# Patient Record
Sex: FEMALE | Race: BLACK OR AFRICAN AMERICAN | NOT HISPANIC OR LATINO | Employment: UNEMPLOYED | URBAN - METROPOLITAN AREA
[De-identification: names, ages, dates, MRNs, and addresses within clinical notes are randomized per-mention and may not be internally consistent; named-entity substitution may affect disease eponyms.]

---

## 2020-05-20 ENCOUNTER — CLINICAL SUPPORT (OUTPATIENT)
Dept: URGENT CARE | Facility: CLINIC | Age: 30
End: 2020-05-20
Payer: MEDICAID

## 2020-05-20 ENCOUNTER — OFFICE VISIT (OUTPATIENT)
Dept: FAMILY MEDICINE | Facility: CLINIC | Age: 30
End: 2020-05-20
Payer: MEDICAID

## 2020-05-20 VITALS
BODY MASS INDEX: 27.34 KG/M2 | TEMPERATURE: 98 F | WEIGHT: 174.19 LBS | OXYGEN SATURATION: 98 % | HEART RATE: 85 BPM | DIASTOLIC BLOOD PRESSURE: 82 MMHG | SYSTOLIC BLOOD PRESSURE: 120 MMHG | HEIGHT: 67 IN

## 2020-05-20 DIAGNOSIS — Z01.818 PRE-OPERATIVE CLEARANCE: Primary | ICD-10-CM

## 2020-05-20 DIAGNOSIS — R51.9 NONINTRACTABLE HEADACHE, UNSPECIFIED CHRONICITY PATTERN, UNSPECIFIED HEADACHE TYPE: Primary | ICD-10-CM

## 2020-05-20 PROCEDURE — 99999 PR PBB SHADOW E&M-EST. PATIENT-LVL III: ICD-10-PCS | Mod: PBBFAC,,, | Performed by: INTERNAL MEDICINE

## 2020-05-20 PROCEDURE — U0003 INFECTIOUS AGENT DETECTION BY NUCLEIC ACID (DNA OR RNA); SEVERE ACUTE RESPIRATORY SYNDROME CORONAVIRUS 2 (SARS-COV-2) (CORONAVIRUS DISEASE [COVID-19]), AMPLIFIED PROBE TECHNIQUE, MAKING USE OF HIGH THROUGHPUT TECHNOLOGIES AS DESCRIBED BY CMS-2020-01-R: HCPCS

## 2020-05-20 PROCEDURE — 99213 OFFICE O/P EST LOW 20 MIN: CPT | Mod: PBBFAC,PO | Performed by: INTERNAL MEDICINE

## 2020-05-20 PROCEDURE — 99999 PR PBB SHADOW E&M-EST. PATIENT-LVL III: CPT | Mod: PBBFAC,,, | Performed by: INTERNAL MEDICINE

## 2020-05-20 PROCEDURE — 99203 OFFICE O/P NEW LOW 30 MIN: CPT | Mod: S$PBB,,, | Performed by: INTERNAL MEDICINE

## 2020-05-20 PROCEDURE — 99203 PR OFFICE/OUTPT VISIT, NEW, LEVL III, 30-44 MIN: ICD-10-PCS | Mod: S$PBB,,, | Performed by: INTERNAL MEDICINE

## 2020-05-20 RX ORDER — BUTALBITAL, ACETAMINOPHEN AND CAFFEINE 50; 325; 40 MG/1; MG/1; MG/1
1 TABLET ORAL EVERY 4 HOURS PRN
Qty: 30 TABLET | Refills: 0 | Status: SHIPPED | OUTPATIENT
Start: 2020-05-20 | End: 2020-06-19

## 2020-05-20 NOTE — PROGRESS NOTES
Subjective:       Patient ID: Figueroa Wallace is a 29 y.o. female.    Chief Complaint: Headache    Pt here for 3 day history of headache. She says it is frontal and feels like constant pressure. She has been taking tyelnol and ibuprofen without relief.  She has some nausea with it.  She denies blurry vision.  She says the headache is constant. She has a mild runny nose and scratchy throat.  No fevers. She has had mild diarrhea.      Review of Systems   Constitutional: Negative for fatigue, fever and unexpected weight change.   HENT: Positive for congestion. Negative for postnasal drip, sinus pain and sore throat.    Eyes: Negative for visual disturbance.   Respiratory: Negative for cough, chest tightness, shortness of breath and wheezing.    Cardiovascular: Negative for chest pain, palpitations and leg swelling.   Gastrointestinal: Positive for diarrhea. Negative for abdominal pain, blood in stool, constipation, nausea and vomiting.   Endocrine: Negative for cold intolerance and heat intolerance.   Genitourinary: Negative for difficulty urinating, dysuria and frequency.   Musculoskeletal: Negative for back pain, joint swelling and myalgias.   Skin: Negative for rash.   Allergic/Immunologic: Negative for environmental allergies.   Neurological: Positive for headaches. Negative for dizziness, seizures and numbness.   Hematological: Does not bruise/bleed easily.   Psychiatric/Behavioral: Negative for agitation and sleep disturbance.       Objective:      Physical Exam   Constitutional: She is oriented to person, place, and time. She appears well-developed and well-nourished.   HENT:   Head: Normocephalic and atraumatic.   Mouth/Throat: Oropharynx is clear and moist.   Eyes: Pupils are equal, round, and reactive to light. Conjunctivae and EOM are normal.   Neck: Normal range of motion. Neck supple. No thyromegaly present.   Cardiovascular: Normal rate, regular rhythm, normal heart sounds and intact distal pulses. Exam  reveals no gallop and no friction rub.   No murmur heard.  Pulmonary/Chest: Effort normal and breath sounds normal. No respiratory distress. She has no wheezes. She has no rales.   Abdominal: Soft. Bowel sounds are normal. She exhibits no distension. There is no tenderness.   Musculoskeletal: Normal range of motion. She exhibits no edema.   Lymphadenopathy:     She has no cervical adenopathy.   Neurological: She is alert and oriented to person, place, and time. No cranial nerve deficit.   Skin: Skin is warm and dry.   Psychiatric: She has a normal mood and affect. Her behavior is normal.       Assessment:       1. Nonintractable headache, unspecified chronicity pattern, unspecified headache type        Plan:       Headache-- fioricet prn, hydration and rest.  Call if no improvement. Pt having runny nose, mild sore throat, and mild gi symptoms- works at [employer]. Will test for covid 19

## 2020-05-20 NOTE — LETTER
May 20, 2020      [facility]  [address] [place] [address]  Phone: [phone]  Fax: [phone]       Patient: Figueroa Wallace   YOB: 1990  Date of Visit: 05/20/2020    To Whom It May Concern:    Sona Wallace  was at [facility] on 05/20/2020. She may return to work/school on 5/22/2020 with no restrictions. Please excuse patient from work on 5/19/2020-5/21/2020.  If you have any questions or concerns, or if I can be of further assistance, please do not hesitate to contact me.    Sincerely,          Lamar Vaughan MD

## 2020-05-21 ENCOUNTER — TELEPHONE (OUTPATIENT)
Dept: FAMILY MEDICINE | Facility: CLINIC | Age: 30
End: 2020-05-21

## 2020-05-21 ENCOUNTER — TELEPHONE (OUTPATIENT)
Dept: URGENT CARE | Facility: CLINIC | Age: 30
End: 2020-05-21

## 2020-05-21 LAB — SARS-COV-2 RNA RESP QL NAA+PROBE: NOT DETECTED

## 2020-05-21 NOTE — TELEPHONE ENCOUNTER
----- Message from Fay Rayo sent at 5/21/2020  3:01 PM CDT -----  Contact: PT  Type: Needs Medical Advice    Who Called:  Pt  Symptoms (please be specific):  cough  How long has patient had these symptoms:  today  Pharmacy name and phone #:    [id number] [address] [address] [place] [facility] [place] [address]  [address] [place] [address]  Phone: [phone] Fax: [phone]  Best Call Back Number: [phone]  Additional Information: Please Advise ---Thank you      Is that what you want?    
Attempted to return call placed to clinic no answer lvm  
19-Oct-2018 03:35

## 2020-05-21 NOTE — TELEPHONE ENCOUNTER
Called patient to discuss COVID-19 results - NOT DETECTED. Patient aware and verbalized understanding.

## 2020-08-10 ENCOUNTER — OFFICE VISIT (OUTPATIENT)
Dept: URGENT CARE | Facility: CLINIC | Age: 30
End: 2020-08-10
Payer: MEDICAID

## 2020-08-10 VITALS
SYSTOLIC BLOOD PRESSURE: 149 MMHG | BODY MASS INDEX: 27.31 KG/M2 | HEIGHT: 67 IN | TEMPERATURE: 99 F | DIASTOLIC BLOOD PRESSURE: 92 MMHG | RESPIRATION RATE: 16 BRPM | WEIGHT: 174 LBS | HEART RATE: 100 BPM | OXYGEN SATURATION: 100 %

## 2020-08-10 DIAGNOSIS — M79.645 THUMB PAIN, LEFT: Primary | ICD-10-CM

## 2020-08-10 PROCEDURE — 73130 XR HAND COMPLETE 3 VIEW LEFT: ICD-10-PCS | Mod: LT,S$GLB,, | Performed by: RADIOLOGY

## 2020-08-10 PROCEDURE — 73130 X-RAY EXAM OF HAND: CPT | Mod: LT,S$GLB,, | Performed by: RADIOLOGY

## 2020-08-10 PROCEDURE — 99214 OFFICE O/P EST MOD 30 MIN: CPT | Mod: S$GLB,,, | Performed by: PHYSICIAN ASSISTANT

## 2020-08-10 PROCEDURE — 99214 PR OFFICE/OUTPT VISIT, EST, LEVL IV, 30-39 MIN: ICD-10-PCS | Mod: S$GLB,,, | Performed by: PHYSICIAN ASSISTANT

## 2020-08-10 RX ORDER — NAPROXEN 500 MG/1
500 TABLET ORAL 2 TIMES DAILY WITH MEALS
Qty: 20 TABLET | Refills: 0 | Status: SHIPPED | OUTPATIENT
Start: 2020-08-10 | End: 2020-08-20

## 2020-08-10 NOTE — PROGRESS NOTES
"Subjective:       Patient ID: Figueroa Wallace is a 29 y.o. female.    Vitals:  height is 5' 7" (1.702 m) and weight is 78.9 kg (174 lb). Her oral temperature is 98.6 °F (37 °C). Her blood pressure is 149/92 (abnormal) and her pulse is 100. Her respiration is 16 and oxygen saturation is 100%.     Chief Complaint: Finger Injury and Hand Pain    Pt present with left thumb pain since yesterday. She believes she may have "knocked" it on something. Pain 9/10    Hand Pain   The incident occurred 12 to 24 hours ago. There was no injury mechanism. The pain is present in the left fingers. The quality of the pain is described as aching. The pain is at a severity of 9/10. The pain has been constant since the incident. Pertinent negatives include no chest pain, muscle weakness, numbness or tingling. Nothing aggravates the symptoms. She has tried nothing for the symptoms. The treatment provided no relief.       Constitution: Negative for chills, fatigue and fever.   HENT: Negative for congestion and sore throat.    Neck: Negative for painful lymph nodes.   Cardiovascular: Negative for chest pain and leg swelling.   Eyes: Negative for double vision and blurred vision.   Respiratory: Negative for cough and shortness of breath.    Gastrointestinal: Negative for nausea, vomiting and diarrhea.   Genitourinary: Negative for dysuria, frequency, urgency and history of kidney stones.   Musculoskeletal: Positive for joint pain. Negative for muscle cramps and muscle ache.   Skin: Negative for color change, pale, rash and bruising.   Allergic/Immunologic: Negative for seasonal allergies.   Neurological: Negative for dizziness, history of vertigo, light-headedness, passing out, headaches and numbness.   Hematologic/Lymphatic: Negative for swollen lymph nodes.   Psychiatric/Behavioral: Negative for nervous/anxious, sleep disturbance and depression. The patient is not nervous/anxious.        Objective:      Physical Exam   Constitutional: She is " oriented to person, place, and time. She appears well-developed. She is cooperative.  Non-toxic appearance. She does not appear ill. No distress.   HENT:   Head: Normocephalic and atraumatic.   Ears:   Right Ear: Hearing normal.   Left Ear: Hearing normal.   Eyes: Conjunctivae and lids are normal.   Neck: Trachea normal, normal range of motion and phonation normal. Neck supple.   Cardiovascular: Normal rate, regular rhythm, normal heart sounds and normal pulses.   Pulmonary/Chest: Effort normal and breath sounds normal.   Abdominal: Normal appearance.   Musculoskeletal:         General: No deformity.      Left hand: She exhibits tenderness. She exhibits normal range of motion, normal two-point discrimination, normal capillary refill, no deformity, no laceration and no swelling. Normal sensation noted.        Hands:    Neurological: She is alert and oriented to person, place, and time. She has normal strength and normal reflexes. No sensory deficit.   Skin: Skin is warm, dry, intact and not diaphoretic. Psychiatric: Her speech is normal and behavior is normal. Judgment and thought content normal.   Nursing note and vitals reviewed.        Xr Hand Complete 3 View Left    Result Date: 8/10/2020  EXAMINATION: XR HAND COMPLETE 3 VIEW LEFT CLINICAL HISTORY: Pain in left finger(s). TECHNIQUE: PA, lateral, and oblique views of the left hand were performed. COMPARISON: None. FINDINGS: No evidence of acute fracture or dislocation.  Soft tissues are symmetric.  No radiopaque foreign body.     No acute bony abnormality. Electronically signed by: Chetan Gomez MD Date:    08/10/2020 Time:    10:44      Assessment:       1. Thumb pain, left        Plan:       All hx was provided by the pt or available as part of established EMR. The pt past medical hx, family hx, social hx, and current medications were reviewed. Interpretation of diagnostics performed today were discussed. Pt to follow up with [facility] if no improvement or for any  concern, and seek treatment in an ER for worsening. Tx options discussed. Pt voiced understanding of all discussed, and agreed with decision making.    Thumb pain, left  -     XR HAND COMPLETE 3 VIEW LEFT; Future; Expected date: 08/10/2020  -     naproxen (NAPROSYN) 500 MG tablet; Take 1 tablet (500 mg total) by mouth 2 (two) times daily with meals. for 10 days  Dispense: 20 tablet; Refill: 0

## 2020-08-10 NOTE — PATIENT INSTRUCTIONS
· Follow up with your primary care if symptoms do not improve, or you may return here at any time.  · If you were referred to a specialist, please follow up with that specialty.  · If you were prescribed antibiotics, please take them to completion.  · If you were prescribed a narcotic or any medication with sedative effects, do not drive or operate heavy equipment or machinery while taking these medications.  · You must understand that you have received treatment at an Urgent Care facility only, and that you may be released before all of your medical problems are known or treated. Urgent Care facilities are not equipped to handle life threatening emergencies. It is recommended that you seek care at an Emergency Department for further evaluation of worsening or concerning symptoms, or possibly life threatening conditions as discussed.                                        If you  smoke, please stop smoking               PLEASE PRACTICE SOCIAL DISTANCING            Hand Contusion  You have a contusion. This is also called a bruise. There is swelling and some bleeding under the skin, but no broken bones. This injury generally takes a few days to a few weeks to heal.  During that time, the bruise will typically change in color from reddish, to purple-blue, to greenish-yellow, then to yellow-brown.  Home care  · Elevate the hand to reduce pain and swelling. As much as possible, sit or lie down with the hand raised about the level of your heart. This is especially important during the first 48 hours.  · Ice the hand to help reduce pain and swelling. Wrap a cold source (ice pack or ice cubes in a plastic bag) in a thin towel. Apply to the bruised area for 20 minutes every 1 to 2 hours the first day. Continue this 3 to 4 times a day until the pain and swelling goes away.  · Unless another medicine was prescribed, you can take acetaminophen, ibuprofen, or naproxen to control pain. (If you have chronic liver or kidney  disease or ever had a stomach ulcer or gastrointestinal bleeding, talk with your doctor before using these medicines.)  Follow up  Follow up with your healthcare provider or our staff as advised. Call if you are not improving within 1 to 2 weeks.  When to seek medical advice   Call your healthcare provider right away if you have any of the following:  · Increased pain or swelling  · Arm becomes cold, blue, numb or tingly  · Signs of infection: Warmth, drainage, or increased redness or pain around the bruise  · Inability to move the injured hand   · Frequent bruising for unknown reasons  Date Last Reviewed: 2/1/2017  © 3299-3701 [employer]. [address] [place] [address], [place] [address]. All rights reserved. This information is not intended as a substitute for professional medical care. Always follow your healthcare professional's instructions.

## 2020-08-10 NOTE — LETTER
August 10, 2020      [facility] [address], [address]  [facility] [address]  Phone: [phone]  Fax: [phone]       Patient: Figueroa Wallace   YOB: 1990  Date of Visit: 08/10/2020    To Whom It May Concern:    Sona Wallace  was at [facility] on 08/10/2020. She may return to work/school on 8/11/2020 with no restrictions. If you have any questions or concerns, or if I can be of further assistance, please do not hesitate to contact me.    Sincerely,    Jaron Robb PA-C

## 2021-04-29 ENCOUNTER — PATIENT MESSAGE (OUTPATIENT)
Dept: RESEARCH | Facility: HOSPITAL | Age: 31
End: 2021-04-29

## 2021-12-03 ENCOUNTER — TELEPHONE (OUTPATIENT)
Dept: FAMILY MEDICINE | Facility: CLINIC | Age: 31
End: 2021-12-03
Payer: MEDICAID

## 2022-10-06 DIAGNOSIS — M25.571 PAIN IN JOINT OF RIGHT ANKLE: Primary | ICD-10-CM

## 2022-10-11 ENCOUNTER — CLINICAL SUPPORT (OUTPATIENT)
Dept: REHABILITATION | Facility: HOSPITAL | Age: 32
End: 2022-10-11
Payer: MEDICAID

## 2022-10-11 DIAGNOSIS — M25.671 DECREASED RANGE OF MOTION OF RIGHT ANKLE: ICD-10-CM

## 2022-10-11 DIAGNOSIS — M25.571 CHRONIC PAIN OF RIGHT ANKLE: ICD-10-CM

## 2022-10-11 DIAGNOSIS — G89.29 CHRONIC PAIN OF RIGHT ANKLE: ICD-10-CM

## 2022-10-11 DIAGNOSIS — R26.9 GAIT DIFFICULTY: ICD-10-CM

## 2022-10-11 PROCEDURE — 97110 THERAPEUTIC EXERCISES: CPT | Mod: PO

## 2022-10-11 PROCEDURE — 97161 PT EVAL LOW COMPLEX 20 MIN: CPT | Mod: PO

## 2022-10-11 NOTE — PLAN OF CARE
[facility]   Physical Therapy Initial Evaluation     Date: 10/11/2022   Name: Figueroa Wallace  Clinic Number: [id number]    Therapy Diagnosis:   Encounter Diagnoses   Name Primary?    Chronic pain of right ankle     Gait difficulty     Decreased range of motion of right ankle      Physician: Pat Weiner MD    Physician Orders: PT Eval and Treat   Medical Diagnosis from Referral: Pain in joint of right ankle   Evaluation Date: 10/11/2022  Authorization Period Expiration: 2022  Plan of Care Expiration: 2022  Progress Note Due: 2022  Visit # / Visits authorized:    FOTO: 1/3    Precautions: Standard     Time In: 800  Time Out: 848  Total Appointment Time (timed & untimed codes): 48 minutes      SUBJECTIVE   Date of onset: ~2 years    History of current condition - Figueroa reports: She was walking in heels two years ago twisted her foot in a plantar flexed and supinated pain. She was placed in a cast for 6 weeks and then she was in a walking boot for 8 weeks beginning in  of this year. She never received PT. She reports she has taken pain medication and received a shot in her ankle. She notes her pain is worse at the end of the day after prolonged standing and walking. No numbness or tingling. Denies any falls.    Falls: No    Imaging, X-ray: Not on file    Prior Therapy: Yes  Social History: Lives with family   Occupation: Surgery  at [employer]   Prior Level of Function: No limitations   Current Level of Function: Increased pain with ADLs, unable to wear heels    Pain:  Current 0/10, worst 10/10, best 0/10   Location: right ankles  Description: Aching  Aggravating Factors: Walking  Easing Factors: pain medication, ice, and rest    Patients goals: No pain     Medical History:   Past Medical History:   Diagnosis Date    Anxiety     H/O  section     H/O hernia repair     History of pre-eclampsia     Migraines        Surgical History:   Figueroa  [place]  has a past surgical history that includes Hernia repair ();  section; cholcystecomy (); Gallbladder surgery (); and Cholecystectomy.    Medications:   Figueroa currently has no medications in their medication list.    Allergies:   Review of patient's allergies indicates:  No Known Allergies     Objective     Observation: Patient in no acute distress      Posture: Pes cavus on (L)     Gait: Increased supination (L) foot, decreased ankle DF (B)     ROM:  Ankle Left Right   Dorsiflexion 15 (NT) degrees 7 (NT) degrees   Plantarflexion 35 (NT) degrees 40 (NT) degrees   Inversion 15 (NT) degrees 10* (NT) degrees   Eversion 20 (NT) degrees 10* (NT) degrees     *pain    MMT:    Ankle Left Right   Dorsiflexion 5/5 4+/5   Plantarflexion 5/5 4/5*   Inversion 4+/5 4/5*   Eversion 4+/5 4+/5     Special Tests:    Ankle Left Right   Anterior Drawer Test Negative Positive     Joint Mobility: Hypomobile (R) talocrural with AP    Palpation: TTP (R) ATFL, peroneals     Sensation: Intact to LT (B) LE's    Flexibility: R gastroc limited with passive dorsiflexion    Balance: Maintains SLS 30 seconds with good balance strategies.      CMS Impairment/Limitation/Restriction for FOTO Ankle Survey    Therapist reviewed FOTO scores for Figueroa Wallace on 10/11/2022.   FOTO documents entered into [employer] - see Media section.    Limitation Score: 44%  Category: Mobility         TREATMENT   Treatment Time In: 825  Treatment Time Out: 849  Total Treatment time separate from Evaluation: 24 minutes    Figueroa received therapeutic exercises to develop strength, ROM, and posture for 14 minutes including:  - Ankle 4-way 2 x 10 YTB  - Short foot 2 x 10 3'' hold  - Standing calf stretch runner stretch 2 x 30s    Figueroa received the following manual therapy techniques: Joint mobilizations and Soft tissue Mobilization were applied to the: (R) ankle for 10 minutes, including:  - IASTM to (R) ATFL and CFL cross friction  - Talocrural joint  mobs AP grade I-II    Home Exercises and Patient Education Provided    Education provided:   - Role of PT, PT POC  - HEP    Written Home Exercises Provided: yes.  Exercises were reviewed and Figueroa was able to demonstrate them prior to the end of the session.  Figueroa demonstrated good  understanding of the education provided.     See EMR under Media for exercises provided 10/11/2022.    Assessment   Figueroa is a 31 y.o. female referred to outpatient Physical Therapy with a medical diagnosis of Pain in joint of right ankle. Physical exam is consistent with chronic (R) ankle pain s/p lateral ankle sprain with ROM, strength, and gait deficits. Primary impairments include AROM, PROM, joint mobility, strength, balance, soft tissue restrictions, and pain which limits functional mobility. This pt is a good candidate for skilled PT tx and stands to benefit from a combination of manual therapy including joint mobilizations with trigger point/myofacscial release, therapeutic exercise to establish core/joint stability, neuromuscular re-education, and modalities Prn. The pt has been educated on their dx/POC and consents to further PT tx.    Pt prognosis is Good.   Pt will benefit from skilled outpatient Physical Therapy to address the deficits stated above and in the chart below, provide pt/family education, and to maximize pt's level of independence.     Plan of care discussed with patient: Yes  Pt's spiritual, cultural and educational needs considered and patient is agreeable to the plan of care and goals as stated below:     Anticipated Barriers for therapy: None    Medical Necessity is demonstrated by the following  History  Co-morbidities and personal factors that may impact the plan of care Co-morbidities:   young age    Personal Factors:   no deficits     low   Examination  Body Structures and Functions, activity limitations and participation restrictions that may impact the plan of care Body Regions:   lower  extremities    Body Systems:    ROM  strength  gross coordinated movement  balance  gait    Participation Restrictions:   Limited by pain with prolonged job duties     Activity limitations:   Learning and applying knowledge  no deficits    General Tasks and Commands  no deficits    Communication  no deficits    Mobility  lifting and carrying objects  walking  driving (bike, car, motorcycle)    Self care  dressing    Domestic Life  shopping  cooking  doing house work (cleaning house, washing dishes, laundry)    Interactions/Relationships  no deficits    Life Areas  employment    Community and Social Life  community life  recreation and leisure         moderate   Clinical Presentation stable and uncomplicated low   Decision Making/ Complexity Score: low     Goals:  Short-Term Goals: 3 weeks  1) The patient will be independent and compliant with initial home exercise program as prescribed by physical therapist.  2) The patient will increase activity range of motion in the right ankle by 5 degrees in order to restore normal mobility for gait.  3) The patient will increase strength in MMT of the right ankle to 4+/5 in order to demonstrate improvements in functional strength for weight-bearing and gait.    Long-Term Goals: 6 weeks  1) Pt to achieve <34% limitation as measured by the FOTO to demonstrate decreased disability.  2) The patient will increase activity range of motion in the right ankle to <10% limitation compared to (L) in order to restore normal mobility for gait.  3) The patient will increase strength in MMT of the right  ankle to 5/5 in order to demonstrate improvements in functional strength for weight-bearing and gait.  4) Patient will improve SLS to >30s to improve balance and stability with ambulation without pain and with good arch control.  5) Pt will report (R) ankle pain <2/10 after 8 hour work day to improve tolerance to job duties.      Plan   Plan of care Certification: 10/11/2022 to  12/16/2022.    Outpatient Physical Therapy 2 times weekly for 12 visits to include the following interventions: Electrical Stimulation prn, Gait Training, Manual Therapy, Moist Heat/ Ice, Neuromuscular Re-ed, Patient Education, Therapeutic Activities, and Therapeutic Exercise.     Gasper Ivy, PT      I CERTIFY THE NEED FOR THESE SERVICES FURNISHED UNDER THIS PLAN OF TREATMENT AND WHILE UNDER MY CARE   Physician's comments:     Physician's Signature: ___________________________________________________

## 2022-10-13 ENCOUNTER — CLINICAL SUPPORT (OUTPATIENT)
Dept: REHABILITATION | Facility: HOSPITAL | Age: 32
End: 2022-10-13
Payer: MEDICAID

## 2022-10-13 DIAGNOSIS — M25.671 DECREASED RANGE OF MOTION OF RIGHT ANKLE: ICD-10-CM

## 2022-10-13 DIAGNOSIS — M25.571 CHRONIC PAIN OF RIGHT ANKLE: Primary | ICD-10-CM

## 2022-10-13 DIAGNOSIS — R26.9 GAIT DIFFICULTY: ICD-10-CM

## 2022-10-13 DIAGNOSIS — G89.29 CHRONIC PAIN OF RIGHT ANKLE: Primary | ICD-10-CM

## 2022-10-13 PROCEDURE — 97110 THERAPEUTIC EXERCISES: CPT | Mod: PO,CQ

## 2022-10-13 NOTE — PROGRESS NOTES
[facility]   Physical Therapy Treatment Note     Name: Figueroa Wallace  Clinic Number: [id number]    Therapy Diagnosis:   Encounter Diagnoses   Name Primary?    Chronic pain of right ankle Yes    Gait difficulty     Decreased range of motion of right ankle      Physician: Pat Weiner MD    Visit Date: 10/13/2022  Physician Orders: PT Eval and Treat   Medical Diagnosis from Referral: Pain in joint of right ankle   Evaluation Date: 10/11/2022  Authorization Period Expiration: 12/31/2022  Plan of Care Expiration: 12/16/2022  Progress Note Due: 11/11/2022  Visit # / Visits authorized: 1/ 20  FOTO: 1/3    PTA Visit #: 1/5     Time In: 0908 AM  Time Out: 0956 AM  Total Billable Time: 30 minutes    Precautions: Standard     SUBJECTIVE     Pt reports: her ankle is painful this morning. Patient states she worked yesterday and then went home and performed her HEP which made soreness a little worse.  She was compliant with home exercise program.  Response to previous treatment: soreness   Functional change: too soon to determine     Pain: 8/10  Location: right ankle    OBJECTIVE     Objective Measures updated at progress report unless specified.     Treatment     Figueroa received the treatments listed below:      therapeutic exercises to develop strength, endurance, ROM, flexibility, posture, and core stabilization for 30 minutes including:  - Upright bike x 5   - Long sitting gastroc stretch with strap x 1 minute x 2   - Ankle circles: CW/CCW 2x10  - Ankle ABCs x 1   - PF, DF, IV with yellow TB 2x10  - Isometric Eversion x 10, 5 sec hold     - Seated heel slides x 2 minutes, 5 sec hold   - Seated rockerboard x 2 minutes, 5 sec hold   - Seated windshield wiper x 2 minutes  - Short foot 2 x 10 3'' hold  - Standing calf stretch runner stretch 2 x 30s     manual therapy techniques: Joint mobilizations were applied to the: R ankle for 15 minutes, including:  - IASTM to (R) ATFL and CFL cross  "friction  - Talocrural joint mobs AP grade I-II    Patient Education and Home Exercises     Home Exercises Provided and Patient Education Provided     Education provided:   - HEP compliance     Written Home Exercises Provided: Patient instructed to cont prior HEP. Exercises were reviewed and Figueroa was able to demonstrate them prior to the end of the session.  Figueroa demonstrated good  understanding of the education provided. See EMR under Patient Instructions for exercises provided during therapy sessions    ASSESSMENT     Figueroa with complaints of pain with pain with isokinetic eversion but this resolved with modification to isometric. Patient demonstrates decreased ROM into DF with firm end feel noted. Palpable "popping" with ROM activities. Verbal cues often provided to ensure slow, controlled ROM to restore neuromuscular control.     Figueroa Is progressing well towards her goals.   Pt prognosis is Good.     Pt will continue to benefit from skilled outpatient physical therapy to address the deficits listed in the problem list box on initial evaluation, provide pt/family education and to maximize pt's level of independence in the home and community environment.     Pt's spiritual, cultural and educational needs considered and pt agreeable to plan of care and goals.     Anticipated barriers to physical therapy: none    Goals:   Short-Term Goals: 3 weeks  1) The patient will be independent and compliant with initial home exercise program as prescribed by physical therapist.  2) The patient will increase activity range of motion in the right ankle by 5 degrees in order to restore normal mobility for gait.  3) The patient will increase strength in MMT of the right ankle to 4+/5 in order to demonstrate improvements in functional strength for weight-bearing and gait.     Long-Term Goals: 6 weeks  1) Pt to achieve <34% limitation as measured by the FOTO to demonstrate decreased disability.  2) The patient will increase " activity range of motion in the right ankle to <10% limitation compared to (L) in order to restore normal mobility for gait.  3) The patient will increase strength in MMT of the right  ankle to 5/5 in order to demonstrate improvements in functional strength for weight-bearing and gait.  4) Patient will improve SLS to >30s to improve balance and stability with ambulation without pain and with good arch control.  5) Pt will report (R) ankle pain <2/10 after 8 hour work day to improve tolerance to job duties.    PLAN     Continue current POC with emphasis on restoring DF ROM, decreasing pain and restoring neuromuscular control.     Azalia Dumont, PTA

## 2022-11-03 ENCOUNTER — CLINICAL SUPPORT (OUTPATIENT)
Dept: REHABILITATION | Facility: HOSPITAL | Age: 32
End: 2022-11-03
Payer: MEDICAID

## 2022-11-03 DIAGNOSIS — M25.571 CHRONIC PAIN OF RIGHT ANKLE: Primary | ICD-10-CM

## 2022-11-03 DIAGNOSIS — M25.671 DECREASED RANGE OF MOTION OF RIGHT ANKLE: ICD-10-CM

## 2022-11-03 DIAGNOSIS — R26.9 GAIT DIFFICULTY: ICD-10-CM

## 2022-11-03 DIAGNOSIS — G89.29 CHRONIC PAIN OF RIGHT ANKLE: Primary | ICD-10-CM

## 2022-11-03 PROCEDURE — 97110 THERAPEUTIC EXERCISES: CPT | Mod: PO

## 2022-11-03 NOTE — PROGRESS NOTES
[facility]   Physical Therapy Treatment Note     Name: Figueroa Wallace  Clinic Number: [id number]    Therapy Diagnosis:   Encounter Diagnoses   Name Primary?    Chronic pain of right ankle Yes    Gait difficulty     Decreased range of motion of right ankle        Physician: Pat Weiner MD    Visit Date: 11/3/2022  Physician Orders: PT Eval and Treat   Medical Diagnosis from Referral: Pain in joint of right ankle   Evaluation Date: 10/11/2022  Authorization Period Expiration: 12/31/2022  Plan of Care Expiration: 12/16/2022  Progress Note Due: 11/11/2022  Visit # / Visits authorized: 2/ 20  FOTO: 1/3    PTA Visit #: 0/5     Time In: 0905  Time Out: 945  Total Billable Time: 40 minutes    Precautions: Standard     SUBJECTIVE     Pt reports: Her ankle was hurting really bad today, she did her HEP last night and it was painful to perform and made her sore today.  She was compliant with home exercise program.  Response to previous treatment: soreness   Functional change: too soon to determine     Pain: 10/10  Location: right ankle    OBJECTIVE     Objective Measures updated at progress report unless specified.     Treatment     Figueroa received the treatments listed below:      therapeutic exercises to develop strength, endurance, ROM, flexibility, posture, and core stabilization for 20 minutes including:  - Upright bike x 10 min L2  - Supine half stretch with strap 2 min  - Ankle circles: CW/CCW 30x each  - Ankle ABCs 2 x 1 min    Not today due to ankle pain:  - PF, DF, IV with yellow TB 2x10  - Seated rockerboard x 2 minutes, 5 sec hold   - Seated windshield wiper x 2 minutes  - Isometric Eversion x 10, 5 sec hold   - Seated heel slides x 2 minutes, 5 sec hold   - Short foot 2 x 10 3'' hold    manual therapy techniques: Joint mobilizations were applied to the: R ankle for 15 minutes, including:  - IASTM to (R) ATFL and CFL cross friction, peroneals  - Talocrural joint mobs AP grade  I-IV    Patient Education and Home Exercises     Home Exercises Provided and Patient Education Provided     Education provided:   - HEP compliance     Written Home Exercises Provided: Patient instructed to cont prior HEP. Exercises were reviewed and Figueroa was able to demonstrate them prior to the end of the session.  Figueroa demonstrated good  understanding of the education provided. See EMR under Patient Instructions for exercises provided during therapy sessions    ASSESSMENT     Figueroa presented with increased swelling of (R) ankle primarily around lateral malleolus. Pt was TTP around ATFL, CFL and sore along medial shin. Pt unable to perform resisted therex due to ankle pain but tolerated AROM and upright bike well. Sees doctor next Tuesday. Recommended she purchase an ankle brace for increased stability with job duties.    Figueroa Is progressing well towards her goals.   Pt prognosis is Good.     Pt will continue to benefit from skilled outpatient physical therapy to address the deficits listed in the problem list box on initial evaluation, provide pt/family education and to maximize pt's level of independence in the home and community environment.     Pt's spiritual, cultural and educational needs considered and pt agreeable to plan of care and goals.     Anticipated barriers to physical therapy: none    Goals:   Short-Term Goals: 3 weeks  1) The patient will be independent and compliant with initial home exercise program as prescribed by physical therapist.  2) The patient will increase activity range of motion in the right ankle by 5 degrees in order to restore normal mobility for gait.  3) The patient will increase strength in MMT of the right ankle to 4+/5 in order to demonstrate improvements in functional strength for weight-bearing and gait.     Long-Term Goals: 6 weeks  1) Pt to achieve <34% limitation as measured by the FOTO to demonstrate decreased disability.  2) The patient will increase activity  range of motion in the right ankle to <10% limitation compared to (L) in order to restore normal mobility for gait.  3) The patient will increase strength in MMT of the right  ankle to 5/5 in order to demonstrate improvements in functional strength for weight-bearing and gait.  4) Patient will improve SLS to >30s to improve balance and stability with ambulation without pain and with good arch control.  5) Pt will report (R) ankle pain <2/10 after 8 hour work day to improve tolerance to job duties.    PLAN     Continue current POC with emphasis on restoring DF ROM, decreasing pain and restoring neuromuscular control.     Gasper Ivy, PT   Delonte Brown, SPT assisted in all aspects of today's treatment including manual therapy and administration of exercises.     I certify that I was present in the room directing the student in service delivery and guiding them using my skilled judgement. As the co-signing therapist, I have reviewed the students documentation and am responsible for the treatment, assessment, and plan.

## 2022-11-04 NOTE — PROGRESS NOTES
[facility]   Physical Therapy Treatment Note     Name: Figueroa Wallace  [facility] Number: [id number]    Therapy Diagnosis:   Encounter Diagnoses   Name Primary?    Chronic pain of right ankle Yes    Gait difficulty     Decreased range of motion of right ankle      Physician: Pat Weiner MD    Visit Date: 11/8/2022  Physician Orders: PT Eval and Treat   Medical Diagnosis from Referral: Pain in joint of right ankle   Evaluation Date: 10/11/2022  Authorization Period Expiration: 12/31/2022  Plan of Care Expiration: 12/16/2022  Progress Note Due: 12/08/2022  Visit # / Visits authorized: 3/ 20  FOTO: 1/3    PTA Visit #: 0/5     Time In: 900  Time Out: 942  Total Billable Time: 42 minutes    Precautions: Standard     SUBJECTIVE     Pt reports: She bought an ankle brace. She continues to note increase in symptoms with prolonged standing and job duties with no significant improvement in her symptoms. Follows up with referring provider tomorrow.   She was compliant with home exercise program.  Response to previous treatment: soreness   Functional change: too soon to determine     Pain: 4/10  Location: right ankle    OBJECTIVE     ROM:  Ankle Right   Dorsiflexion 15 (NT) degrees   Plantarflexion 35 (NT) degrees   Inversion 35 (NT) degrees   Eversion 15 (NT) degrees         MMT:     Ankle Right   Dorsiflexion 5/5   Plantarflexion 5/5*   Inversion 4+/5   Eversion 3/5*      SLS (R): 30s with mild to mod sway and increase in pain    FOTO: 44%      Treatment     Figueroa received the treatments listed below:      therapeutic exercises to develop strength, endurance, ROM, flexibility, posture, and core stabilization for 36 minutes including:  - Upright bike x 10 min L2  - Ankle circles: CW/CCW 30x each  - Ankle ABCs 2 x 1 min  - 2 x 10 inversion YTB  - Ankle eversion isometrics 5x 5'' hold -> increase in pain improved with reduced intensity  - Seated short foot, 2 x 10  - Seated eversion sweeps, 2 x  10    Not today due to ankle pain:  - PF, DF, IV with yellow TB 2x10  - Seated rockerboard x 2 minutes, 5 sec hold   - Seated windshield wiper x 2 minutes  - Isometric Eversion x 10, 5 sec hold   - Seated heel slides x 2 minutes, 5 sec hold   - Short foot 2 x 10 3'' hold    manual therapy techniques: Joint mobilizations were applied to the: R ankle for 6  minutes, including:  - IASTM to (R) peroneals and peroneal tendon  - Talocrural joint mobs AP grade I-IV - NP    Patient Education and Home Exercises     Home Exercises Provided and Patient Education Provided     Education provided:   - HEP compliance     Written Home Exercises Provided: Patient instructed to cont prior HEP. Exercises were reviewed and Figueroa was able to demonstrate them prior to the end of the session.  Figueroa demonstrated good  understanding of the education provided. See EMR under Patient Instructions for exercises provided during therapy sessions    ASSESSMENT     Figueroa presented today with 4/10 ankle pain that increased to 8/10 primarily with resisted eversion. Good tolerance to IASTM to peroneals. Reassessment showed improved range of motion and strength with continued deficits in eversion strength. She is slowly progressing toward her goals and is following up with MD tomorrow. Continue isometric eversion strengthening and soft tissue as indicated to improve her tolerance to job duties and prolonged standing.     Figueroa Is progressing well towards her goals.   Pt prognosis is Good.     Pt will continue to benefit from skilled outpatient physical therapy to address the deficits listed in the problem list box on initial evaluation, provide pt/family education and to maximize pt's level of independence in the home and community environment.     Pt's spiritual, cultural and educational needs considered and pt agreeable to plan of care and goals.     Anticipated barriers to physical therapy: none    Goals:   Short-Term Goals: 3 weeks   1) The  patient will be independent and compliant with initial home exercise program as prescribed by physical therapist. (Met)  2) The patient will increase activity range of motion in the right ankle by 5 degrees in order to restore normal mobility for gait. (Met)  3) The patient will increase strength in MMT of the right ankle to 4+/5 in order to demonstrate improvements in functional strength for weight-bearing and gait. (Progressing, no met)     Long-Term Goals: 6 weeks  1) Pt to achieve <34% limitation as measured by the FOTO to demonstrate decreased disability. (Progressing, no met)  2) The patient will increase active range of motion in the right ankle to <10% limitation compared to (L) in order to restore normal mobility for gait. (Met)  3) The patient will increase strength in MMT of the right  ankle to 5/5 in order to demonstrate improvements in functional strength for weight-bearing and gait. (Progressing, not met)  4) Patient will improve SLS to >30s to improve balance and stability with ambulation without pain and with good arch control. (Progressing, not met)  5) Pt will report (R) ankle pain <2/10 after 8 hour work day to improve tolerance to job duties. (Progressing, not met)    PLAN     Continue current POC with emphasis on restoring DF ROM, decreasing pain and restoring neuromuscular control.     Gasper Ivy, PT   Delonte Brown, [facility] assisted in all aspects of today's treatment including manual therapy and administration of exercises.     I certify that I was present in the room directing the student in service delivery and guiding them using my skilled judgement. As the co-signing therapist, I have reviewed the students documentation and am responsible for the treatment, assessment, and plan.

## 2022-11-08 ENCOUNTER — CLINICAL SUPPORT (OUTPATIENT)
Dept: REHABILITATION | Facility: HOSPITAL | Age: 32
End: 2022-11-08
Payer: MEDICAID

## 2022-11-08 ENCOUNTER — DOCUMENTATION ONLY (OUTPATIENT)
Dept: REHABILITATION | Facility: HOSPITAL | Age: 32
End: 2022-11-08

## 2022-11-08 DIAGNOSIS — G89.29 CHRONIC PAIN OF RIGHT ANKLE: Primary | ICD-10-CM

## 2022-11-08 DIAGNOSIS — M25.571 CHRONIC PAIN OF RIGHT ANKLE: Primary | ICD-10-CM

## 2022-11-08 DIAGNOSIS — M25.671 DECREASED RANGE OF MOTION OF RIGHT ANKLE: ICD-10-CM

## 2022-11-08 DIAGNOSIS — R26.9 GAIT DIFFICULTY: ICD-10-CM

## 2022-11-08 PROCEDURE — 97110 THERAPEUTIC EXERCISES: CPT | Mod: PO

## 2022-11-08 NOTE — PROGRESS NOTES
PT met face to face with PTA, Azalia Dumont, to discuss patient's treatment plan and progress towards established goals.  Treatment will be continued as described in initial report/eval and progress notes.  Patient will be seen by physical therapist minimally every sixth visit and at least once per month.    Gasper Ivy, PT  11/08/2022      Face to face meeting completed with Gasper Ivy, PT regarding current status and progress of [place].    Azalia Dumont, PTA

## 2023-01-10 ENCOUNTER — DOCUMENTATION ONLY (OUTPATIENT)
Dept: REHABILITATION | Facility: HOSPITAL | Age: 33
End: 2023-01-10
Payer: MEDICAID

## 2023-01-10 NOTE — PROGRESS NOTES
[facility]  Physical Therapy Discharge Note    Name: Figueroa Wallace  Clinic Number: [id number]    Therapy Diagnosis:    Chronic pain of right ankle Yes    Gait difficulty      Decreased range of motion of right ankle      Physician: Pat Weiner MD    Physician Orders: PT Eval and Treat   Medical Diagnosis from Referral: Pain in joint of right ankle   Evaluation Date: 10/11/2022      Date of Last visit: 11/08/2022  Total Visits Received: 4    ASSESSMENT        Discharge reason: Patient has not attended therapy since 11/08/2022    Discharge FOTO Score: 56%    Goals:   Short-Term Goals: 3 weeks   1) The patient will be independent and compliant with initial home exercise program as prescribed by physical therapist. (Met)  2) The patient will increase activity range of motion in the right ankle by 5 degrees in order to restore normal mobility for gait. (Met)  3) The patient will increase strength in MMT of the right ankle to 4+/5 in order to demonstrate improvements in functional strength for weight-bearing and gait. (Progressing, no met)     Long-Term Goals: 6 weeks  1) Pt to achieve <34% limitation as measured by the FOTO to demonstrate decreased disability. (Progressing, no met)  2) The patient will increase active range of motion in the right ankle to <10% limitation compared to (L) in order to restore normal mobility for gait. (Met)  3) The patient will increase strength in MMT of the right  ankle to 5/5 in order to demonstrate improvements in functional strength for weight-bearing and gait. (Progressing, not met)  4) Patient will improve SLS to >30s to improve balance and stability with ambulation without pain and with good arch control. (Progressing, not met)  5) Pt will report (R) ankle pain <2/10 after 8 hour work day to improve tolerance to job duties. (Progressing, not met)    PLAN   This patient is discharged from physical therapy      Gasper Ivy, PT

## 2023-03-28 DIAGNOSIS — M25.512 LEFT SHOULDER PAIN: Primary | ICD-10-CM
